# Patient Record
Sex: MALE | Race: WHITE | NOT HISPANIC OR LATINO | URBAN - METROPOLITAN AREA
[De-identification: names, ages, dates, MRNs, and addresses within clinical notes are randomized per-mention and may not be internally consistent; named-entity substitution may affect disease eponyms.]

---

## 2022-04-30 ENCOUNTER — TELEPHONE ENCOUNTER (OUTPATIENT)
Dept: URBAN - METROPOLITAN AREA CLINIC 121 | Facility: CLINIC | Age: 57
End: 2022-04-30

## 2022-05-01 ENCOUNTER — TELEPHONE ENCOUNTER (OUTPATIENT)
Dept: URBAN - METROPOLITAN AREA CLINIC 121 | Facility: CLINIC | Age: 57
End: 2022-05-01

## 2022-05-01 RX ORDER — SIMVASTATIN 10 MG/1
QD TABLET, FILM COATED ORAL
Status: ACTIVE | COMMUNITY
Start: 2014-05-02

## 2022-05-01 RX ORDER — LISINOPRIL AND HYDROCHLOROTHIAZIDE TABLETS 20; 12.5 MG/1; MG/1
QD TABLET ORAL
Status: ACTIVE | COMMUNITY
Start: 2014-05-02

## 2022-05-01 RX ORDER — MONTELUKAST 10 MG/1
QD TABLET, FILM COATED ORAL
Status: ACTIVE | COMMUNITY
Start: 2014-05-02

## 2022-05-05 ENCOUNTER — LAB OUTSIDE AN ENCOUNTER (OUTPATIENT)
Dept: URBAN - METROPOLITAN AREA CLINIC 27 | Facility: CLINIC | Age: 57
End: 2022-05-05

## 2022-05-05 ENCOUNTER — OFFICE VISIT (OUTPATIENT)
Dept: URBAN - METROPOLITAN AREA CLINIC 27 | Facility: CLINIC | Age: 57
End: 2022-05-05
Payer: COMMERCIAL

## 2022-05-05 ENCOUNTER — WEB ENCOUNTER (OUTPATIENT)
Dept: URBAN - METROPOLITAN AREA CLINIC 27 | Facility: CLINIC | Age: 57
End: 2022-05-05

## 2022-05-05 VITALS
HEIGHT: 76 IN | BODY MASS INDEX: 38.36 KG/M2 | WEIGHT: 315 LBS | HEART RATE: 67 BPM | DIASTOLIC BLOOD PRESSURE: 85 MMHG | SYSTOLIC BLOOD PRESSURE: 149 MMHG

## 2022-05-05 DIAGNOSIS — Z86.010 HX OF ADENOMATOUS POLYP OF COLON: ICD-10-CM

## 2022-05-05 DIAGNOSIS — Z80.0 FAMILY HISTORY OF COLON CANCER: ICD-10-CM

## 2022-05-05 DIAGNOSIS — I10 PRIMARY HYPERTENSION: ICD-10-CM

## 2022-05-05 DIAGNOSIS — E66.01 MORBID OBESITY: ICD-10-CM

## 2022-05-05 DIAGNOSIS — R74.01 HIGH TRANSAMINASE LEVELS: ICD-10-CM

## 2022-05-05 PROBLEM — 429047008 HISTORY OF ADENOMATOUS POLYP OF COLON: Status: ACTIVE | Noted: 2022-05-05

## 2022-05-05 PROBLEM — 370218001: Status: ACTIVE | Noted: 2022-05-05

## 2022-05-05 PROBLEM — 59621000 PRIMARY HYPERTENSION: Status: ACTIVE | Noted: 2022-05-05

## 2022-05-05 PROBLEM — 238136002 MORBID OBESITY: Status: ACTIVE | Noted: 2022-05-05

## 2022-05-05 PROCEDURE — 99204 OFFICE O/P NEW MOD 45 MIN: CPT | Performed by: INTERNAL MEDICINE

## 2022-05-05 RX ORDER — SIMVASTATIN 10 MG/1
QD TABLET, FILM COATED ORAL
Status: ACTIVE | COMMUNITY
Start: 2014-05-02

## 2022-05-05 RX ORDER — MONTELUKAST 10 MG/1
QD TABLET, FILM COATED ORAL
Status: ACTIVE | COMMUNITY
Start: 2014-05-02

## 2022-05-05 RX ORDER — LISINOPRIL AND HYDROCHLOROTHIAZIDE TABLETS 20; 12.5 MG/1; MG/1
QD TABLET ORAL
Status: ACTIVE | COMMUNITY
Start: 2014-05-02

## 2022-05-05 NOTE — PHYSICAL EXAM GASTROINTESTINAL
Abdomen, soft, nontender, nondistended, no guarding or rigidity, no masses palpable, normal bowel sounds; morbid obesity

## 2022-05-05 NOTE — HPI-TODAY'S VISIT:
Patient here at the request of Dr. Cronin for repeat colonoscopy. His last colonoscopy was in 2014; three polyps were removed. 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 The prep was somewhat suboptimal. He was supposed to return for a follow-up colonoscopy in 2016 but did not do so until now. He currently has no significant GI symptoms. He is actively trying to lose weight and has thus far lost 20 pounds with [employer]. His paternal grandfather had colon cancer at age 72. Recent labs showed a mildly elevated ALT of 58.

## 2022-05-09 ENCOUNTER — TELEPHONE ENCOUNTER (OUTPATIENT)
Dept: URBAN - METROPOLITAN AREA CLINIC 27 | Facility: CLINIC | Age: 57
End: 2022-05-09

## 2022-05-09 RX ORDER — LISINOPRIL AND HYDROCHLOROTHIAZIDE TABLETS 20; 12.5 MG/1; MG/1
QD TABLET ORAL
Status: ACTIVE | COMMUNITY
Start: 2014-05-02

## 2022-05-09 RX ORDER — POLYETHYLENE GLYCOL-3350 AND ELECTROLYTES 236; 6.74; 5.86; 2.97; 22.74 G/274.31G; G/274.31G; G/274.31G; G/274.31G; G/274.31G
AS DIRECTED POWDER, FOR SOLUTION ORAL ONCE A DAY
Qty: 1 | Refills: 0 | OUTPATIENT
Start: 2022-05-09 | End: 2022-05-10

## 2022-05-09 RX ORDER — SIMVASTATIN 10 MG/1
QD TABLET, FILM COATED ORAL
Status: ACTIVE | COMMUNITY
Start: 2014-05-02

## 2022-05-09 RX ORDER — MONTELUKAST 10 MG/1
QD TABLET, FILM COATED ORAL
Status: ACTIVE | COMMUNITY
Start: 2014-05-02

## 2022-05-31 ENCOUNTER — CLAIMS CREATED FROM THE CLAIM WINDOW (OUTPATIENT)
Dept: URBAN - METROPOLITAN AREA SURGERY CENTER 7 | Facility: SURGERY CENTER | Age: 57
End: 2022-05-31
Payer: COMMERCIAL

## 2022-05-31 ENCOUNTER — CLAIMS CREATED FROM THE CLAIM WINDOW (OUTPATIENT)
Dept: URBAN - METROPOLITAN AREA CLINIC 4 | Facility: CLINIC | Age: 57
End: 2022-05-31
Payer: COMMERCIAL

## 2022-05-31 DIAGNOSIS — D12.3 ADENOMA OF TRANSVERSE COLON: ICD-10-CM

## 2022-05-31 DIAGNOSIS — D12.3 BENIGN NEOPLASM OF TRANSVERSE COLON: ICD-10-CM

## 2022-05-31 DIAGNOSIS — Z80.0 BROTHER AT YOUNG AGE FAMILY HISTORY OF COLON CANCER: ICD-10-CM

## 2022-05-31 DIAGNOSIS — Z86.010 ADENOMAS PERSONAL HISTORY OF COLONIC POLYPS: ICD-10-CM

## 2022-05-31 DIAGNOSIS — K63.89 HYPERPLASTIC POLYP OF INTESTINE: ICD-10-CM

## 2022-05-31 DIAGNOSIS — K63.89 CYST OF DUODENUM: ICD-10-CM

## 2022-05-31 PROCEDURE — 45380 COLONOSCOPY AND BIOPSY: CPT | Performed by: INTERNAL MEDICINE

## 2022-05-31 PROCEDURE — G8907 PT DOC NO EVENTS ON DISCHARG: HCPCS | Performed by: INTERNAL MEDICINE

## 2022-05-31 PROCEDURE — 45385 COLONOSCOPY W/LESION REMOVAL: CPT | Performed by: INTERNAL MEDICINE

## 2022-05-31 PROCEDURE — 88305 TISSUE EXAM BY PATHOLOGIST: CPT | Performed by: PATHOLOGY

## 2022-05-31 RX ORDER — MONTELUKAST 10 MG/1
QD TABLET, FILM COATED ORAL
Status: ACTIVE | COMMUNITY
Start: 2014-05-02

## 2022-05-31 RX ORDER — LISINOPRIL AND HYDROCHLOROTHIAZIDE TABLETS 20; 12.5 MG/1; MG/1
QD TABLET ORAL
Status: ACTIVE | COMMUNITY
Start: 2014-05-02

## 2022-05-31 RX ORDER — SIMVASTATIN 10 MG/1
QD TABLET, FILM COATED ORAL
Status: ACTIVE | COMMUNITY
Start: 2014-05-02

## 2022-06-13 ENCOUNTER — TELEPHONE ENCOUNTER (OUTPATIENT)
Dept: URBAN - METROPOLITAN AREA CLINIC 27 | Facility: CLINIC | Age: 57
End: 2022-06-13

## 2023-04-17 ENCOUNTER — OFFICE VISIT (OUTPATIENT)
Dept: URBAN - METROPOLITAN AREA CLINIC 96 | Facility: CLINIC | Age: 58
End: 2023-04-17
Payer: COMMERCIAL

## 2023-04-17 ENCOUNTER — WEB ENCOUNTER (OUTPATIENT)
Dept: URBAN - METROPOLITAN AREA CLINIC 96 | Facility: CLINIC | Age: 58
End: 2023-04-17

## 2023-04-17 VITALS
DIASTOLIC BLOOD PRESSURE: 94 MMHG | HEIGHT: 76 IN | WEIGHT: 315 LBS | HEART RATE: 100 BPM | BODY MASS INDEX: 38.36 KG/M2 | TEMPERATURE: 97.9 F | SYSTOLIC BLOOD PRESSURE: 141 MMHG

## 2023-04-17 DIAGNOSIS — R10.11 ABDOMINAL DISCOMFORT IN RIGHT UPPER QUADRANT: ICD-10-CM

## 2023-04-17 DIAGNOSIS — R10.9 RIGHT FLANK PAIN: ICD-10-CM

## 2023-04-17 PROCEDURE — 99214 OFFICE O/P EST MOD 30 MIN: CPT | Performed by: INTERNAL MEDICINE

## 2023-04-17 RX ORDER — PANTOPRAZOLE SODIUM 20 MG/1
1 TABLET TABLET, DELAYED RELEASE ORAL
Qty: 45 | Refills: 0 | OUTPATIENT
Start: 2023-04-17

## 2023-04-17 NOTE — HPI-TODAY'S VISIT:
57 y.o. WM, works in real estate PCP - Mehrdad Lots of pressure in upper abd as well as R flank dull ache - can be very bad Worse after food - stays dull and tight Syx for past few months No N/V Haven't tried any medicines  Lots of constipation in past - now, very easy

## 2023-04-18 ENCOUNTER — WEB ENCOUNTER (OUTPATIENT)
Dept: URBAN - METROPOLITAN AREA CLINIC 96 | Facility: CLINIC | Age: 58
End: 2023-04-18

## 2023-04-18 LAB
A/G RATIO: 1.8
ALBUMIN: 4.8
ALKALINE PHOSPHATASE: 67
ALT (SGPT): 105
APPEARANCE: CLEAR
AST (SGOT): 58
BACTERIA: (no result)
BASO (ABSOLUTE): 0
BASOS: 1
BILIRUBIN, TOTAL: 0.2
BILIRUBIN: NEGATIVE
BUN/CREATININE RATIO: 19
BUN: 14
CALCIUM: 10.1
CARBON DIOXIDE, TOTAL: 26
CAST TYPE: (no result)
CASTS: (no result)
CHLORIDE: 99
COMMENT: (no result)
CREATININE: 0.72
CRYSTAL TYPE: (no result)
CRYSTALS: (no result)
EGFR: 105
ENDOMYSIAL ANTIBODY IGA: NEGATIVE
EOS (ABSOLUTE): 0.1
EOS: 2
EPITHELIAL CELLS (NON RENAL): (no result)
EPITHELIAL CELLS (RENAL): (no result)
GLOBULIN, TOTAL: 2.7
GLUCOSE: 95
GLUCOSE: NEGATIVE
HEMATOCRIT: 48.7
HEMATOLOGY COMMENTS:: (no result)
HEMOGLOBIN: 16.6
IMMATURE CELLS: (no result)
IMMATURE GRANS (ABS): 0
IMMATURE GRANULOCYTES: 0
IMMUNOGLOBULIN A, QN, SERUM: 215
KETONES: NEGATIVE
LIPASE: 51
LYMPHS (ABSOLUTE): 2.2
LYMPHS: 39
MCH: 31.4
MCHC: 34.1
MCV: 92
MICROSCOPIC EXAMINATION: (no result)
MICROSCOPIC EXAMINATION: (no result)
MONOCYTES(ABSOLUTE): 0.5
MONOCYTES: 8
MUCUS THREADS: (no result)
NEUTROPHILS (ABSOLUTE): 2.8
NEUTROPHILS: 50
NITRITE, URINE: NEGATIVE
NRBC: (no result)
OCCULT BLOOD: NEGATIVE
PH: 6
PLATELETS: 282
POTASSIUM: 4.3
PROTEIN, TOTAL: 7.5
PROTEIN: NEGATIVE
RBC: (no result)
RBC: 5.28
RDW: 13.1
SODIUM: 137
SPECIFIC GRAVITY: 1.02
T-TRANSGLUTAMINASE (TTG) IGA: <2
TRICHOMONAS: (no result)
URINALYSIS REFLEX: (no result)
URINE-COLOR: YELLOW
UROBILINOGEN,SEMI-QN: 0.2
WBC ESTERASE: NEGATIVE
WBC: (no result)
WBC: 5.6
YEAST: (no result)

## 2023-04-24 ENCOUNTER — WEB ENCOUNTER (OUTPATIENT)
Dept: URBAN - METROPOLITAN AREA CLINIC 96 | Facility: CLINIC | Age: 58
End: 2023-04-24

## 2023-04-24 ENCOUNTER — OFFICE VISIT (OUTPATIENT)
Dept: URBAN - METROPOLITAN AREA CLINIC 95 | Facility: CLINIC | Age: 58
End: 2023-04-24
Payer: COMMERCIAL

## 2023-04-24 DIAGNOSIS — K76.0 FATTY (CHANGE OF) LIVER: ICD-10-CM

## 2023-04-24 PROCEDURE — 76700 US EXAM ABDOM COMPLETE: CPT | Performed by: INTERNAL MEDICINE

## 2023-04-26 LAB — H. PYLORI STOOL AG, EIA: NEGATIVE

## 2023-05-26 ENCOUNTER — OFFICE VISIT (OUTPATIENT)
Dept: URBAN - METROPOLITAN AREA CLINIC 96 | Facility: CLINIC | Age: 58
End: 2023-05-26
Payer: COMMERCIAL

## 2023-05-26 ENCOUNTER — LAB OUTSIDE AN ENCOUNTER (OUTPATIENT)
Dept: URBAN - METROPOLITAN AREA CLINIC 96 | Facility: CLINIC | Age: 58
End: 2023-05-26

## 2023-05-26 VITALS
HEART RATE: 70 BPM | SYSTOLIC BLOOD PRESSURE: 140 MMHG | DIASTOLIC BLOOD PRESSURE: 79 MMHG | HEIGHT: 76 IN | WEIGHT: 315 LBS | TEMPERATURE: 94.8 F | BODY MASS INDEX: 38.36 KG/M2

## 2023-05-26 DIAGNOSIS — R10.11 ABDOMINAL DISCOMFORT IN RIGHT UPPER QUADRANT: ICD-10-CM

## 2023-05-26 DIAGNOSIS — R74.8 ELEVATED LIVER ENZYMES: ICD-10-CM

## 2023-05-26 DIAGNOSIS — R10.84 GENERALIZED POSTPRANDIAL ABDOMINAL PAIN: ICD-10-CM

## 2023-05-26 PROCEDURE — 99214 OFFICE O/P EST MOD 30 MIN: CPT

## 2023-05-26 RX ORDER — PANTOPRAZOLE SODIUM 20 MG/1
1 TABLET TABLET, DELAYED RELEASE ORAL
Qty: 45 | Refills: 0 | Status: ON HOLD | COMMUNITY
Start: 2023-04-17

## 2023-05-26 RX ORDER — PANTOPRAZOLE SODIUM 20 MG/1
1 TABLET TABLET, DELAYED RELEASE ORAL
Qty: 45 | Refills: 0 | OUTPATIENT

## 2023-05-26 RX ORDER — DICYCLOMINE HYDROCHLORIDE 20 MG/1
1 TABLET TABLET ORAL
Qty: 90 | Refills: 0 | OUTPATIENT
Start: 2023-05-26 | End: 2023-06-25

## 2023-05-26 NOTE — HPI-TODAY'S VISIT:
Previously in 4/2023 with Dr. De La Torre, 57 y.o. WM, works in real estate PCP - Mehrdad Lots of pressure in upper abd as well as R flank dull ache - can be very bad Worse after food - stays dull and tight Syx for past few months No N/V Haven't tried any medicines  Lots of constipation in past - now, very easy . Today on 5/26/2023, Blood work completed revealed stable blood counts.  Elevated liver enzymes with AST of 58 and ALT of 105.  H. pylori stool test was negative.  Celiac disease panel also negative as well as lipase.  Ultrasound completed on 4/24/2023.  Findings included hepatic steatosis.  Pancreas and common bile duct were not well visualized.  Remainder of the examination was unremarkable. Continues to have RUQ abdominal pain- specifically notes that he cannot lay on right side. "kills" first thing in the morning but also after eating  He does not believe pantoprazole helped- he poses concern for side effects  Has lost 20lbs in the past 6 weeks Pain is described as postprandial and also lower abdominal. Since dietary changes, he has had improvement in severity  Having a BM daily- denies BRBPR or melena Feels since dietary changes, BMs are more regular. Used to go every 2-3 days

## 2023-05-30 LAB
A/G RATIO: 2
ACTIN (SMOOTH MUSCLE) ANTIBODY: 4
ALBUMIN: 4.9
ALKALINE PHOSPHATASE: 72
ALT (SGPT): 81
ANA DIRECT: POSITIVE
AST (SGOT): 46
BILIRUBIN, TOTAL: 0.6
BUN/CREATININE RATIO: 18
BUN: 14
CALCIUM: 10
CARBON DIOXIDE, TOTAL: 24
CHLORIDE: 102
CREATININE: 0.8
EGFR: 102
FERRITIN, SERUM: 159
GLOBULIN, TOTAL: 2.5
GLUCOSE: 86
HBSAG SCREEN: NEGATIVE
HCV AB: NON REACTIVE
HEP A AB, IGM: NEGATIVE
HEP B CORE AB, IGM: NEGATIVE
IMMUNOGLOBULIN G, QN, SERUM: 1013
INTERPRETATION:: (no result)
IRON BIND.CAP.(TIBC): 300
IRON SATURATION: 34
IRON: 102
MITOCHONDRIAL (M2) ANTIBODY: <20
POTASSIUM: 4.4
PROTEIN, TOTAL: 7.4
SODIUM: 140
UIBC: 198

## 2023-06-22 ENCOUNTER — LAB OUTSIDE AN ENCOUNTER (OUTPATIENT)
Dept: URBAN - METROPOLITAN AREA CLINIC 96 | Facility: CLINIC | Age: 58
End: 2023-06-22

## 2023-06-22 ENCOUNTER — DASHBOARD ENCOUNTERS (OUTPATIENT)
Age: 58
End: 2023-06-22

## 2023-06-22 ENCOUNTER — WEB ENCOUNTER (OUTPATIENT)
Dept: URBAN - METROPOLITAN AREA CLINIC 96 | Facility: CLINIC | Age: 58
End: 2023-06-22

## 2023-06-23 ENCOUNTER — TELEPHONE ENCOUNTER (OUTPATIENT)
Dept: URBAN - METROPOLITAN AREA CLINIC 98 | Facility: CLINIC | Age: 58
End: 2023-06-23

## 2023-06-27 ENCOUNTER — WEB ENCOUNTER (OUTPATIENT)
Dept: URBAN - METROPOLITAN AREA CLINIC 96 | Facility: CLINIC | Age: 58
End: 2023-06-27

## 2023-06-27 ENCOUNTER — ERX REFILL RESPONSE (OUTPATIENT)
Dept: URBAN - METROPOLITAN AREA CLINIC 98 | Facility: CLINIC | Age: 58
End: 2023-06-27

## 2023-06-27 RX ORDER — DICYCLOMINE HYDROCHLORIDE 20 MG/1
TAKE 1 TABLET BY MOUTH THREE TIMES A DAY AS NEEDED FOR 30 DAYS TABLET ORAL
Qty: 90 TABLET | Refills: 0 | OUTPATIENT

## 2023-06-29 ENCOUNTER — WEB ENCOUNTER (OUTPATIENT)
Dept: URBAN - METROPOLITAN AREA CLINIC 96 | Facility: CLINIC | Age: 58
End: 2023-06-29

## 2023-06-30 ENCOUNTER — LAB OUTSIDE AN ENCOUNTER (OUTPATIENT)
Dept: URBAN - METROPOLITAN AREA CLINIC 96 | Facility: CLINIC | Age: 58
End: 2023-06-30

## 2023-07-03 ENCOUNTER — WEB ENCOUNTER (OUTPATIENT)
Dept: URBAN - METROPOLITAN AREA CLINIC 96 | Facility: CLINIC | Age: 58
End: 2023-07-03

## 2023-08-03 ENCOUNTER — ERX REFILL RESPONSE (OUTPATIENT)
Dept: URBAN - METROPOLITAN AREA CLINIC 98 | Facility: CLINIC | Age: 58
End: 2023-08-03

## 2023-08-03 RX ORDER — DICYCLOMINE HYDROCHLORIDE 20 MG/1
TAKE 1 TABLET BY MOUTH THREE TIMES A DAY AS NEEDED FOR 30 DAYS TABLET ORAL
Qty: 90 TABLET | Refills: 0 | OUTPATIENT

## 2023-08-03 RX ORDER — DICYCLOMINE HYDROCHLORIDE 20 MG/1
TAKE 1 TABLET BY MOUTH THREE TIMES A DAY AS NEEDED TABLET ORAL
Qty: 90 TABLET | Refills: 0 | OUTPATIENT

## 2023-09-01 ENCOUNTER — ERX REFILL RESPONSE (OUTPATIENT)
Dept: URBAN - METROPOLITAN AREA CLINIC 98 | Facility: CLINIC | Age: 58
End: 2023-09-01

## 2023-09-01 RX ORDER — DICYCLOMINE HYDROCHLORIDE 20 MG/1
TAKE 1 TABLET BY MOUTH THREE TIMES A DAY AS NEEDED TABLET ORAL
Qty: 90 TABLET | Refills: 0 | OUTPATIENT

## 2023-09-18 ENCOUNTER — ERX REFILL RESPONSE (OUTPATIENT)
Dept: URBAN - METROPOLITAN AREA CLINIC 98 | Facility: CLINIC | Age: 58
End: 2023-09-18

## 2023-09-18 RX ORDER — DICYCLOMINE HYDROCHLORIDE 20 MG/1
TAKE 1 TABLET BY MOUTH THREE TIMES A DAY AS NEEDED TABLET ORAL
Qty: 270 TABLET | Refills: 1 | OUTPATIENT

## 2023-09-18 RX ORDER — DICYCLOMINE HYDROCHLORIDE 20 MG/1
TAKE 1 TABLET BY MOUTH THREE TIMES A DAY AS NEEDED TABLET ORAL
Qty: 90 TABLET | Refills: 0 | OUTPATIENT

## 2025-01-17 ENCOUNTER — OFFICE VISIT (OUTPATIENT)
Dept: URBAN - METROPOLITAN AREA CLINIC 27 | Facility: CLINIC | Age: 60
End: 2025-01-17